# Patient Record
Sex: MALE
[De-identification: names, ages, dates, MRNs, and addresses within clinical notes are randomized per-mention and may not be internally consistent; named-entity substitution may affect disease eponyms.]

---

## 2018-09-20 ENCOUNTER — HOSPITAL ENCOUNTER (EMERGENCY)
Dept: HOSPITAL 97 - ER | Age: 23
Discharge: HOME | End: 2018-09-20
Payer: COMMERCIAL

## 2018-09-20 DIAGNOSIS — K59.00: Primary | ICD-10-CM

## 2018-09-20 LAB — UA COMPLETE W REFLEX CULTURE PNL UR: (no result)

## 2018-09-20 PROCEDURE — 74022 RADEX COMPL AQT ABD SERIES: CPT

## 2018-09-20 PROCEDURE — 81015 MICROSCOPIC EXAM OF URINE: CPT

## 2018-09-20 PROCEDURE — 81003 URINALYSIS AUTO W/O SCOPE: CPT

## 2018-09-20 PROCEDURE — 99283 EMERGENCY DEPT VISIT LOW MDM: CPT

## 2018-09-20 NOTE — EDPHYS
Physician Documentation                                                                           

 [facility]                                                                

Name: Emile Avalos                                                                                

Age: 23 yrs                                                                                       

Sex: Male                                                                                         

: 1995                                                                                   

MRN: [id number]                                                                                   

Arrival Date: 2018                                                                          

Time: 13:28                                                                                       

Account#: [id number]                                                                            

Bed 12                                                                                            

Private MD:                                                                                       

ED Physician Hardik Ye                                                                       

HPI:                                                                                              

                                                                                             

15:56 This 23 yrs old  Male presents to ER via Ambulatory with complaints of Back     snw 

      Pain.                                                                                       

15:56 The patient presents with pain that is acute, with no known mechanism of injury. The    snw 

      symptoms are located in the mid back area. Onset: The symptoms/episode began/occurred       

      suddenly, 4 day(s) ago, and became persistent. The pain does not radiate. Associated        

      signs and symptoms: Pertinent positives: constipation. The problem was sustained from       

      unknown cause. Severity of symptoms: At their worst the symptoms were moderate. The         

      patient has not experienced similar symptoms in the past. It is unknown whether or not      

      the patient has recently seen a physician.                                                  

                                                                                                  

Historical:                                                                                       

- Allergies:                                                                                      

13:45 No Known Allergies;                                                                     aa5 

- PMHx:                                                                                           

13:45 Asthma;                                                                                 aa5 

- PSHx:                                                                                           

13:45 None;                                                                                   aa5 

                                                                                                  

- Immunization history:: Adult Immunizations up to date.                                          

- Social history:: Smoking status: Patient/guardian denies using tobacco.                         

- Ebola Screening: : No symptoms or risks identified at this time.                                

                                                                                                  

                                                                                                  

ROS:                                                                                              

15:55 Constitutional: Negative for fever, chills, and weight loss, Eyes: Negative for injury, snw 

      pain, redness, and discharge, ENT: Negative for injury, pain, and discharge, Neck:          

      Negative for injury, pain, and swelling, Cardiovascular: Negative for chest pain,           

      palpitations, and edema, Respiratory: Negative for shortness of breath, cough,              

      wheezing, and pleuritic chest pain, Abdomen/GI: Negative for abdominal pain, nausea,        

      vomiting, diarrhea, positive for constipation, : Negative for injury, bleeding,           

      discharge, and swelling, MS/Extremity: Negative for injury and deformity, Skin:             

      Negative for injury, rash, and discoloration, Neuro: Negative for headache, weakness,       

      numbness, tingling, and seizure.                                                            

15:55 Back: Positive for pain at rest.                                                            

                                                                                                  

Exam:                                                                                             

15:54 Constitutional:  This is a well developed, well nourished patient who is awake, alert,  snw 

      and in no acute distress. Head/Face:  Normocephalic, atraumatic. Eyes:  Pupils equal        

      round and reactive to light, extra-ocular motions intact.  Lids and lashes normal.          

      Conjunctiva and sclera are non-icteric and not injected.  Cornea within normal limits.      

      Periorbital areas with no swelling, redness, or edema. ENT:  Nares patent. No nasal         

      discharge, no septal abnormalities noted.  Tympanic membranes are normal and external       

      auditory canals are clear.  Oropharynx with no redness, swelling, or masses, exudates,      

      or evidence of obstruction, uvula midline.  Mucous membranes moist. Neck:  Trachea          

      midline, no thyromegaly or masses palpated, and no cervical lymphadenopathy.  Supple,       

      full range of motion without nuchal rigidity, or vertebral point tenderness.  No            

      Meningismus. Chest/axilla:  Normal chest wall appearance and motion.  Nontender with no     

      deformity.  No lesions are appreciated. Cardiovascular:  Regular rate and rhythm with a     

      normal S1 and S2.  No gallops, murmurs, or rubs.  Normal PMI, no JVD.  No pulse             

      deficits. Respiratory:  Lungs have equal breath sounds bilaterally, clear to                

      auscultation and percussion.  No rales, rhonchi or wheezes noted.  No increased work of     

      breathing, no retractions or nasal flaring. Abdomen/GI:  Soft, non-tender, with normal      

      bowel sounds.  No distension or tympany.  No guarding or rebound.  No evidence of           

      tenderness throughout. Skin:  Warm, dry with normal turgor.  Normal color with no           

      rashes, no lesions, and no evidence of cellulitis. MS/ Extremity:  Pulses equal, no         

      cyanosis.  Neurovascular intact.  Full, normal range of motion. Neuro:  Awake and           

      alert, GCS 15, oriented to person, place, time, and situation.  Cranial nerves II-XII       

      grossly intact.  Motor strength 5/5 in all extremities.  Sensory grossly intact.            

      Cerebellar exam normal.  Normal gait.                                                       

15:54 Back: pain, that is moderate, ROM is painless, normal spinal alignment noted, CVA           

      tenderness, is noted bilaterally.                                                           

15:56 Neuro: Exam negative for acute changes.                                                 snw 

                                                                                                  

Vital Signs:                                                                                      

13:46  / 84; Pulse 80; Resp 16 S; Temp 98.0(TE); Pulse Ox 98% on R/A; Weight 99.79 kg   aa5 

      (R); Height 5 ft. 9 in. (175.26 cm) (R); Pain 6/10;                                         

14:41  / 79; Pulse 74; Resp 16; Pulse Ox 98% on R/A; Pain 6/10;                         iw  

13:46 Body Mass Index 32.49 (99.79 kg, 175.26 cm)                                             aa5 

                                                                                                  

MDM:                                                                                              

13:58 Patient medically screened.                                                             snw 

15:15 Data reviewed: vital signs, nurses notes. Data interpreted: Pulse oximetry: on room air snw 

      is 98 %. Interpretation: normal. Counseling: I had a detailed discussion with the           

      patient and/or guardian regarding: the historical points, exam findings, and any            

      diagnostic results supporting the discharge/admit diagnosis, lab results, radiology         

      results, the need for outpatient follow up, to return to the emergency department if        

      symptoms worsen or persist or if there are any questions or concerns that arise at          

      home. Special discussion: Based on the history and exam findings, there is no               

      indication for further emergent testing or inpatient evaluation. I discussed with the       

      patient/guardian the need to see the primary care provider for further evaluation of        

      the symptoms.                                                                               

                                                                                                  

                                                                                             

13:59 Order name: Urine Microscopic Only; Complete Time: 15:37                                snw 

                                                                                             

15:07 Order name: Urine Dipstick--Ancillary (enter results); Complete Time: 15:35             iw  

                                                                                             

13:59 Order name: Abdomen Acute Series XRAY; Complete Time: 14:52                             snw 

                                                                                             

13:59 Order name: Urine Dipstick-Ancillary (obtain specimen); Complete Time: 14:58            snw 

                                                                                                  

Administered Medications:                                                                         

15:15 Drug: Simethicone 120 mg Route: PO;                                                     iw  

15:30 Follow up: Response: No adverse reaction                                                iw  

15:15 Drug: Dulcolax Suppository 10 mg {Note: given to pt to administer at home .} Route: AL; iw  

15:30 Follow up: Response: No adverse reaction                                                iw  

15:15 Drug: Bisacodyl 10 mg Route: PO;                                                        iw  

15:30 Follow up: Response: No adverse reaction                                                iw  

                                                                                                  

                                                                                                  

Disposition:                                                                                      

17:11 Co-signature as Attending Physician, Hardik Ye MD I agree with the assessment and   kdr 

      plan of care.                                                                               

                                                                                                  

Disposition:                                                                                      

18 15:14 Discharged to Home. Impression: Constipation, unspecified, Low back pain.          

- Condition is Stable.                                                                            

- Discharge Instructions: Back Pain, Adult, Constipation, Adult, Rehydration, Adult,              

  Heat Therapy.                                                                                   

- Prescriptions for Diclofenac Sodium 75 mg Oral Tablet Sustained Release - take 1                

  tablet by ORAL route 2 times per day; 30 tablet. Miralax 17 gram/dose Oral - take 1             

  packet by ORAL route once daily dilute powder in 8 ounces of water or juice; 1 box.             

- Work release form, Medication Reconciliation Form, Thank You Letter, Antibiotic                 

  Education, Prescription Opioid Use form.                                                        

- Follow up: Private Physician; When: 1 - 2 days; Reason: Recheck today's complaints,             

  Continuance of care, Re-evaluation by your physician. Follow up: Emergency                      

  Department; When: As needed; Reason: Worsening of condition.                                    

                                                                                                  

                                                                                                  

                                                                                                  

Signatures:                                                                                       

Dispatcher [employer]                           EDMS                                                 

Hardik Ye MD MD kdr Therrien, Shelly, EDISON-C                 FNP-Noraw                                                  

Ange Bronson, RN                     RN   iw                                                   

Deepali Lamar RN                     RN   aa5                                                  

                                                                                                  

Corrections: (The following items were deleted from the chart)                                    

15:46 15:14 2018 15:14 Discharged to Home. Impression: Constipation, unspecified; Low   iw  

      back pain. Condition is Stable. Forms are Medication Reconciliation Form, Thank You         

      Letter, Antibiotic Education, Prescription Opioid Use. Follow up: Private Physician;        

      When: 1 - 2 days; Reason: Recheck today's complaints, Continuance of care,                  

      Re-evaluation by your physician. Follow up: Emergency Department; When: As needed;          

      Reason: Worsening of condition. snw                                                         

                                                                                                  

**************************************************************************************************

## 2018-09-20 NOTE — ER
Nurse's Notes                                                                                     

 [facility]                                                                

Name: Emile Avalos                                                                                

Age: 23 yrs                                                                                       

Sex: Male                                                                                         

: 1995                                                                                   

MRN: [id number]                                                                                   

Arrival Date: 2018                                                                          

Time: 13:28                                                                                       

Account#: [id number]                                                                            

Bed 12                                                                                            

Private MD:                                                                                       

Diagnosis: Constipation, unspecified;Low back pain                                                

                                                                                                  

Presentation:                                                                                     

                                                                                             

13:44 Presenting complaint: Patient states: mid back pain that began Monday. Pt also reports  aa5 

      constipation. Pt states "I had a small bowel movement yesterday". Pt denies abd pain,       

      denies nausea, denies vomiting. Transition of care: patient was not received from           

      another setting of care. Onset of symptoms was 2018. Risk Assessment: Do you      

      want to hurt yourself or someone else? Patient reports no desire to harm self or            

      others. Initial Sepsis Screen: Does the patient meet any 2 criteria? No. Patient's          

      initial sepsis screen is negative. Does the patient have a suspected source of              

      infection? No. Patient's initial sepsis screen is negative. Care prior to arrival: None.    

13:44 Method Of Arrival: Ambulatory                                                           aa5 

13:44 Acuity: COY 3                                                                           aa5 

                                                                                                  

Triage Assessment:                                                                                

14:40 General: Behavior is calm, cooperative. General: Appears in no apparent distress.       iw  

      Musculoskeletal: Reports pain in back and mid back area. Musculoskeletal: Range of          

      motion: intact in all extremities.                                                          

                                                                                                  

Historical:                                                                                       

- Allergies:                                                                                      

13:45 No Known Allergies;                                                                     aa5 

- PMHx:                                                                                           

13:45 Asthma;                                                                                 aa5 

- PSHx:                                                                                           

13:45 None;                                                                                   aa5 

                                                                                                  

- Immunization history:: Adult Immunizations up to date.                                          

- Social history:: Smoking status: Patient/guardian denies using tobacco.                         

- Ebola Screening: : No symptoms or risks identified at this time.                                

                                                                                                  

                                                                                                  

Screening:                                                                                        

15:35 Abuse screen: Denies threats or abuse. Denies injuries from another. Nutritional        iw  

      screening: No deficits noted. Tuberculosis screening: No symptoms or risk factors           

      identified. Fall Risk None identified.                                                      

                                                                                                  

Assessment:                                                                                       

14:40 General: Appears in no apparent distress. comfortable. Pain: Complains of pain in back. iw  

      Neuro: Level of Consciousness is awake, alert, obeys commands, Oriented to person,          

      place, time, situation, Moves all extremities. Full function. GI: Reports constipation.     

15:32 Reassessment: Patient appears in no apparent distress at this time. Patient and/or      iw  

      family updated on plan of care and expected duration. Pain level reassessed. Patient is     

      alert, oriented x 3, equal unlabored respirations, skin warm/dry/pink.                      

                                                                                                  

Vital Signs:                                                                                      

13:46  / 84; Pulse 80; Resp 16 S; Temp 98.0(TE); Pulse Ox 98% on R/A; Weight 99.79 kg   aa5 

      (R); Height 5 ft. 9 in. (175.26 cm) (R); Pain 6/10;                                         

14:41  / 79; Pulse 74; Resp 16; Pulse Ox 98% on R/A; Pain 6/10;                         iw  

13:46 Body Mass Index 32.49 (99.79 kg, 175.26 cm)                                             aa5 

                                                                                                  

ED Course:                                                                                        

13:28 Patient arrived in ED.                                                                  mr  

13:45 Triage completed.                                                                       aa5 

13:45 Arm band placed on.                                                                     aa5 

13:58 Mena Contreras FNP-C is PHCP.                                                        snw 

13:58 Hardik Ye MD is Attending Physician.                                              snw 

14:28 X-ray completed. Patient tolerated procedure well. Patient moved to radiology           [facility] 

      AMBULATORY. Patient moved back from radiology.                                              

14:29 Abdomen Acute Series XRAY In Process Unspecified.                                       EDMS

14:32 Ange Bronson, RN is Primary Nurse.                                                   iw  

14:40 Patient has correct armband on for positive identification.                             iw  

15:45 No provider procedures requiring assistance completed. Patient did not have IV access   iw  

      during this emergency room visit.                                                           

                                                                                                  

Administered Medications:                                                                         

15:15 Drug: Simethicone 120 mg Route: PO;                                                     iw  

15:30 Follow up: Response: No adverse reaction                                                iw  

15:15 Drug: Dulcolax Suppository 10 mg {Note: given to pt to administer at home .} Route: NV; iw  

15:30 Follow up: Response: No adverse reaction                                                iw  

15:15 Drug: Bisacodyl 10 mg Route: PO;                                                        iw  

15:30 Follow up: Response: No adverse reaction                                                iw  

                                                                                                  

                                                                                                  

Outcome:                                                                                          

15:14 Discharge ordered by MD.                                                                snw 

15:45 Discharged to home ambulatory, with family.                                             iw  

15:45 Condition: good                                                                             

15:45 Discharge instructions given to patient, family, Instructed on discharge instructions,      

      follow up and referral plans. medication usage, Demonstrated understanding of               

      instructions, follow-up care, medications, Prescriptions given X 2.                         

15:46 Patient left the ED.                                                                    iw  

                                                                                                  

Signatures:                                                                                       

Dispatcher HouzeMe                           EDMena Maldonado, FNP-C                 FNP-Csnw                                                  

Sandy Nguyen                                mr                                                   

SanjayVeronica                               mh1                                                  

Ange Bronson, RN                     RN   iw                                                   

Deepali Lamar RN                     RN   aa5                                                  

                                                                                                  

**************************************************************************************************

## 2018-09-20 NOTE — RAD REPORT
EXAM DESCRIPTION:

RAD - Abdomen Acute Series - 9/20/2018 2:32 pm

 

CLINICAL HISTORY:  Abdominal pain

 

FINDINGS:  Free air is not seen beneath the diaphragm.

 

The lungs appear clear.

 

A moderate amount of stool is present throughout the colon.

 

A radiopaque genitourinary calculus is not seen